# Patient Record
Sex: FEMALE | Race: OTHER | Employment: FULL TIME | URBAN - METROPOLITAN AREA
[De-identification: names, ages, dates, MRNs, and addresses within clinical notes are randomized per-mention and may not be internally consistent; named-entity substitution may affect disease eponyms.]

---

## 2021-02-12 ENCOUNTER — TRANSCRIBE ORDER (OUTPATIENT)
Dept: SCHEDULING | Age: 31
End: 2021-02-12

## 2021-02-12 DIAGNOSIS — N63.0 LUMP OR MASS IN BREAST: Primary | ICD-10-CM

## 2021-02-22 ENCOUNTER — HOSPITAL ENCOUNTER (OUTPATIENT)
Dept: MAMMOGRAPHY | Age: 31
Discharge: HOME | End: 2021-02-22
Attending: OBSTETRICS & GYNECOLOGY
Payer: COMMERCIAL

## 2021-02-22 DIAGNOSIS — N63.0 LUMP OR MASS IN BREAST: ICD-10-CM

## 2021-02-22 PROCEDURE — 76642 ULTRASOUND BREAST LIMITED: CPT

## 2021-03-12 ENCOUNTER — NURSE TRIAGE (OUTPATIENT)
Dept: OTHER | Facility: CLINIC | Age: 31
End: 2021-03-12

## 2021-03-12 NOTE — TELEPHONE ENCOUNTER
Location of employment: [place] [id number]     Location of injury (body part involved): back strain-mid back on right side- radiation to right glut    Time of injury: 3/12/2021 at 1000    Last 4 of SSN: [id number]    Triage indicates for caller to home care. Patient states she has a prescription for Flexeril at home and will be using that once she is there. She states she does not want to be seen today. She was given care advice including worsening of symptoms and calling back for any changes- she verbalizes understanding of this. Caller directed to do home care. Care advice provided, caller verbalizes understanding; denies any other questions or concerns. Reason for Disposition   Back pain or stiffness from bending or twisting injury    Answer Assessment - Initial Assessment Questions  1. MECHANISM: \"How did the injury happen? \" (Consider the possibility of domestic violence or elder abuse)      Lateral transfer from chair to the bed- awkward position of furniture in the room- was reaching across to pull the patient from the chair to the bed    2. ONSET: \"When did the injury happen? \" (Minutes or hours ago)      1000 at 3/12    3. LOCATION: \"What part of the back is injured? \"      Mid back right side    4. SEVERITY: \"Can you move the back normally? \"      Yes    5. PAIN: \"Is there any pain? \" If so, ask: \"How bad is the pain? \"   (Scale 1-10; or mild, moderate, severe)      4/10 constant pain- has tried standing, sitting, Tylenol, and Ibuprofen    6. CORD SYMPTOMS: Any weakness or numbness of the arms or legs? \"      Denies numbness to either    7. SIZE: For cuts, bruises, or swelling, ask: \"How large is it? \" (e.g., inches or centimeters)      Denies    8. TETANUS: For any breaks in the skin, ask: \"When was the last tetanus booster? \"      Denies    9. OTHER SYMPTOMS: \"Do you have any other symptoms? \" (e.g., abdominal pain, blood in urine)        Denies all   10.  PREGNANCY: \"Is there any chance you are pregnant? \" \"When was your last menstrual period? \"       Denies.  LMP has an IUD    Protocols used: BACK INJURY-ADULT-[place]    See above documentation